# Patient Record
Sex: FEMALE | Race: WHITE | NOT HISPANIC OR LATINO | Employment: OTHER | URBAN - NONMETROPOLITAN AREA
[De-identification: names, ages, dates, MRNs, and addresses within clinical notes are randomized per-mention and may not be internally consistent; named-entity substitution may affect disease eponyms.]

---

## 2023-12-14 ENCOUNTER — OFFICE VISIT (OUTPATIENT)
Dept: URGENT CARE | Facility: CLINIC | Age: 48
End: 2023-12-14
Payer: MEDICARE

## 2023-12-14 VITALS
RESPIRATION RATE: 17 BRPM | SYSTOLIC BLOOD PRESSURE: 128 MMHG | OXYGEN SATURATION: 98 % | HEART RATE: 74 BPM | TEMPERATURE: 98.6 F | DIASTOLIC BLOOD PRESSURE: 68 MMHG

## 2023-12-14 DIAGNOSIS — L02.213 ABSCESS OF CHEST WALL: Primary | ICD-10-CM

## 2023-12-14 PROCEDURE — G0463 HOSPITAL OUTPT CLINIC VISIT: HCPCS

## 2023-12-14 PROCEDURE — 87070 CULTURE OTHR SPECIMN AEROBIC: CPT

## 2023-12-14 PROCEDURE — 99203 OFFICE O/P NEW LOW 30 MIN: CPT

## 2023-12-14 PROCEDURE — 87205 SMEAR GRAM STAIN: CPT

## 2023-12-14 RX ORDER — SULFAMETHOXAZOLE AND TRIMETHOPRIM 800; 160 MG/1; MG/1
1 TABLET ORAL EVERY 12 HOURS SCHEDULED
Qty: 14 TABLET | Refills: 0 | Status: SHIPPED | OUTPATIENT
Start: 2023-12-14 | End: 2023-12-21

## 2023-12-14 RX ORDER — GABAPENTIN 300 MG/1
300 CAPSULE ORAL DAILY
COMMUNITY

## 2023-12-15 NOTE — PATIENT INSTRUCTIONS
Take prescribed antibiotic as instructed. Tylenol or ibuprofen for pain or fever. Bandages frequently. May place antibiotic ointment once every other day. If no improvement, follow-up with your family doctor. Go to the emergency room if any symptoms worsen such as worsening of pain, fever, surrounding redness. Follow up with PCP in 3-5 days. Proceed to  ER if symptoms worsen. Cyst   WHAT YOU NEED TO KNOW:   A cyst is a round, firm lump found almost anywhere on your body. Cysts may grow slowly but are usually not cancer. DISCHARGE INSTRUCTIONS:   Return to the emergency department if:   You have a fever. The area around your cyst becomes swollen, red, and painful. Your cyst continues to drain for 2 days after you start antibiotics. Call your doctor if:   You continue to have pain, even after treatment. Your cyst returns or gets larger. Your limb that has the cyst gets weak, numb, stiff, or unstable. You have questions or concerns about your condition or care. Medicines: You may need any of the following:  NSAIDs , such as ibuprofen, help decrease swelling, pain, and fever. NSAIDs can cause stomach bleeding or kidney problems in certain people. If you take blood thinner medicine, always ask your healthcare provider if NSAIDs are safe for you. Always read the medicine label and follow directions. For women, birth control pills  may help control your monthly cycle, prevent ovarian cysts, or cause them to shrink. Steroid medicine  may be injected into the cyst to decrease inflammation. Antibiotics  may be given to treat or prevent a bacterial infection. Take your medicine as directed. Contact your healthcare provider if you think your medicine is not helping or if you have side effects. Tell your provider if you are allergic to any medicine. Keep a list of the medicines, vitamins, and herbs you take. Include the amounts, and when and why you take them.  Bring the list or the pill bottles to follow-up visits. Carry your medicine list with you in case of an emergency. Care for your wound as directed: If you have had your cyst drained or removed, care for your wound as directed. Carefully wash the wound with soap and water. Dry the area and put on new, clean bandages as directed. Change your bandages when they get wet or dirty. Follow up with your doctor as directed: Your wound may need to be checked if your cyst was removed in the emergency department. You may need to see a surgeon if your cyst could not be removed. Write down your questions so you remember to ask during your visits. © Copyright Kristine [employer] La [employer] 2023 Information is for End User's use only and may not be sold, redistributed or otherwise used for commercial purposes. The above information is an  only. It is not intended as medical advice for individual conditions or treatments. Talk to your doctor, nurse or pharmacist before following any medical regimen to see if it is safe and effective for you.

## 2023-12-15 NOTE — PROGRESS NOTES
[facility]        NAME: Trever Espinal is a 51 y.o. female  : 1975    MRN: [id number]  DATE: 2023  TIME: 8:02 AM    Assessment and Plan   Abscess of chest wall [L02.213]  1. Abscess of chest wall  sulfamethoxazole-trimethoprim (BACTRIM DS) 800-160 mg per tablet    Wound culture and Gram stain        Small 1.5 cm x 1 cm inflamed abscess to the mid/inferior chest wall. There is bloody drainage with some thick discharge. The area was expressed. Without any incision. Wound culture sent for testing. Will start on Bactrim. Advised to follow-up with family doctor. Advised to go to the ER if any symptoms worsen. Wound was cleaned with saline and dermal wound cleanser as well as bacitracin and bandage. Patient Instructions     Take prescribed antibiotic as instructed. Tylenol or ibuprofen for pain or fever. Bandages frequently. May place antibiotic ointment once every other day. If no improvement, follow-up with your family doctor. Go to the emergency room if any symptoms worsen such as worsening of pain, fever, surrounding redness. Follow up with PCP in 3-5 days. Proceed to  ER if symptoms worsen. Chief Complaint     Chief Complaint   Patient presents with    Wound Infection     Started 1 day ago  Upper mid abdomen reddened area, with scant purulent drainage           History of Present Illness       65-year-old female presents to the clinic with family member due to red and painful wound on the mid chest/upper abdomen area. Noticed this a day ago. Denies any at home remedies. States that has been draining at home. Denies any fever, chills, chest pain, shortness of breath. Review of Systems   Review of Systems   Constitutional: Negative. HENT: Negative. Respiratory: Negative. Cardiovascular: Negative. Musculoskeletal: Negative. Skin:  Positive for wound. Neurological: Negative.           Current Medications       Current Outpatient Medications: gabapentin (NEURONTIN) 300 mg capsule, Take 300 mg by mouth daily, Disp: , Rfl:     sulfamethoxazole-trimethoprim (BACTRIM DS) 800-160 mg per tablet, Take 1 tablet by mouth every 12 (twelve) hours for 7 days, Disp: 14 tablet, Rfl: 0    Current Allergies     Allergies as of 2023    (No Known Allergies)            The following portions of the patient's history were reviewed and updated as appropriate: allergies, current medications, past family history, past medical history, past social history, past surgical history and problem list.     Past Medical History:   Diagnosis Date    Fibromyalgia     Migraines     Neuropathy        Past Surgical History:   Procedure Laterality Date    CARPAL TUNNEL RELEASE       SECTION       SECTION         No family history on file. Medications have been verified. Objective   /68   Pulse 74   Temp 98.6 °F (37 °C)   Resp 17   SpO2 98%        Physical Exam     Physical Exam  Constitutional:       General: She is not in acute distress. Appearance: Normal appearance. She is not ill-appearing or diaphoretic. HENT:      Head: Normocephalic and atraumatic. Eyes:      Extraocular Movements: Extraocular movements intact. Pupils: Pupils are equal, round, and reactive to light. Cardiovascular:      Rate and Rhythm: Normal rate and regular rhythm. Pulses: Normal pulses. Heart sounds: Normal heart sounds. Pulmonary:      Effort: Pulmonary effort is normal.      Breath sounds: Normal breath sounds. Skin:     General: Skin is warm and dry. Capillary Refill: Capillary refill takes less than 2 seconds. Findings: Abscess (1.5 x 1 cm abscess to the anterior/inferior chest wall. Purulent drainage. Mild thick drainage expressed. Minimal tenderness. Wound is slightly open.) present. Neurological:      General: No focal deficit present. Mental Status: She is alert and oriented to person, place, and time. Mental status is at baseline.    Psychiatric:         Mood and Affect: Mood normal.         Behavior: Behavior normal.

## 2023-12-17 LAB
BACTERIA WND AEROBE CULT: ABNORMAL
GRAM STN SPEC: ABNORMAL
GRAM STN SPEC: ABNORMAL

## 2023-12-18 LAB
BACTERIA WND AEROBE CULT: ABNORMAL
GRAM STN SPEC: ABNORMAL
GRAM STN SPEC: ABNORMAL